# Patient Record
Sex: FEMALE | Race: BLACK OR AFRICAN AMERICAN
[De-identification: names, ages, dates, MRNs, and addresses within clinical notes are randomized per-mention and may not be internally consistent; named-entity substitution may affect disease eponyms.]

---

## 2017-07-25 ENCOUNTER — HOSPITAL ENCOUNTER (EMERGENCY)
Dept: HOSPITAL 62 - ER | Age: 82
Discharge: HOME | End: 2017-07-25
Payer: MEDICARE

## 2017-07-25 VITALS — DIASTOLIC BLOOD PRESSURE: 70 MMHG | SYSTOLIC BLOOD PRESSURE: 189 MMHG
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
## 2017-07-25 DIAGNOSIS — S41.152A: Primary | ICD-10-CM

## 2017-07-25 DIAGNOSIS — I48.91: ICD-10-CM

## 2017-07-25 DIAGNOSIS — M79.602: ICD-10-CM

## 2017-07-25 PROCEDURE — 99283 EMERGENCY DEPT VISIT LOW MDM: CPT

## 2017-07-25 PROCEDURE — 90471 IMMUNIZATION ADMIN: CPT

## 2017-07-25 PROCEDURE — 93010 ELECTROCARDIOGRAM REPORT: CPT

## 2017-07-25 PROCEDURE — 90715 TDAP VACCINE 7 YRS/> IM: CPT

## 2017-07-25 PROCEDURE — 93005 ELECTROCARDIOGRAM TRACING: CPT

## 2017-07-25 NOTE — ER DOCUMENT REPORT
ED Animal Bite





- General


Chief Complaint: Animal Bite


Stated Complaint: ANIMAL BITE/LEFT ARM


Time Seen by Provider: 07/25/17 16:25


Mode of Arrival: Ambulatory


Information source: Patient


Notes: 


Patient states that she was taking water to her pet donkey when he grabbed her 

left arm.  She tried to pull her arm away and this caused some skin to tear.  

She denies any other injuries or wounds.  She states that she does not believe 

the donkey was provoked. The donkey  has not been acting abnormally lately.  

She does not know if the donkey is up-to-date on immunizations.  She complains 

of some mild left arm pain.  It is worse when touched them better for prolonged 

period there is no radiation of the symptoms.  The symptoms are constant.


TRAVEL OUTSIDE OF THE [place] IN LAST 30 DAYS: No





- HPI


Location of injury: LUE


Severity of injury: Bitten


Onset: Just prior to arrival


Quality of pain: Achy


Severity: Mild


Appearance of animal: Appeared well


Animal's immunizations: Unknown





- Related Data


Allergies/Adverse Reactions: 


 





No Known Allergies Allergy (Verified 07/25/17 16:20)


 











Past Medical History





- General


Information source: Patient





- Social History


Smoking Status: Never Smoker


Frequency of alcohol use: None


Drug Abuse: None


Family History: Reviewed & Not Pertinent


Patient has suicidal ideation: No


Patient has homicidal ideation: No





- Past Medical History


Cardiac Medical History: Reports: Hx Hypertension


Renal/ Medical History: Denies: Hx Peritoneal Dialysis


Past Surgical History: Reports: Hx Hysterectomy, Hx Orthopedic Surgery





- Immunizations


Hx Diphtheria, Pertussis, Tetanus Vaccination: Yes





Review of Systems





- Review of Systems


Constitutional: denies: Chills, Fever


Cardiovascular: denies: Chest pain, Palpitations, Heart racing


Respiratory: denies: Cough, Short of breath


Gastrointestinal: denies: Diarrhea, Vomiting





Physical Exam





- Vital signs


Vitals: 


 











Temp Pulse Resp BP Pulse Ox


 


 99.1 F   84   16   186/83 H  94 


 


 07/25/17 16:18  07/25/17 16:18  07/25/17 16:18  07/25/17 16:18  07/25/17 16:18














- General


General appearance: Appears well, Alert


In distress: None





- HEENT


Head: Normocephalic, Atraumatic


Eyes: Normal


Conjunctiva: Normal


External canal: Normal


Mouth/Lips: Normal


Mucous membranes: Moist


Pharynx: Normal


Neck: Normal





- Respiratory


Respiratory status: No respiratory distress


Chest status: Nontender


Breath sounds: Normal


Chest palpation: Normal





- Cardiovascular


Rhythm: Irregularly irregular


Pulses: Normal: Radial


Normal capillary refill: Yes





- Abdominal


Inspection: Normal


Distension: No distension


Bowel sounds: Normal


Tenderness: Nontender


Organomegaly: No organomegaly





- Back


Back: Normal, Nontender





- Extremities


General upper extremity: Tender, Normal ROM, Normal temperature.  No: Normal 

inspection, Normal color


General lower extremity: Normal inspection, Nontender, Normal color, Normal ROM

, Normal temperature, Normal weight bearing.  No: Jie's sign





- Neurological


Neuro grossly intact: Yes


Cognition: Normal


Orientation: AAOx4


[place] Coma Scale Eye Opening: Spontaneous


[place] Coma Scale Verbal: Oriented


[place] Coma Scale Motor: Obeys Commands


[place] Coma Scale Total: 15


Speech: Normal


Motor strength normal: LUE, RUE, LLE, RLE


Sensory: Normal





- Psychological


Associated symptoms: Normal affect, Normal mood





- Skin


Skin Temperature: Warm


Skin Moisture: Dry


Notes: 


Patient has a circular ecchymotic area with some abrasions consistent with a 

bite on the left posterior upper arm.





Course





- Vital Signs


Vital signs: 


 











Temp Pulse Resp BP Pulse Ox


 


 99.1 F   84   16   186/83 H  94 


 


 07/25/17 16:18  07/25/17 16:18  07/25/17 16:18  07/25/17 16:18  07/25/17 16:18














- EKG Interpretation by Me


Rate: Normal


Rhythm: A.Fib, A.Flutter


Axis/QRS: Left axis deviation - no ischemic changes





Discharge





- Discharge


Clinical Impression: 


 Animal bite of upper arm, Atrial fibrillation, new onset





Condition: Stable


Disposition: HOME, SELF-CARE


Instructions:  Animal Bites ([facility]), Atrial Fibrillation ([facility])


Additional Instructions: 


You have atrial fibrillation on your EKG.  Please call Dr. Monroy first thing in 

the morning to schedule an evaluation.  Please take an 81 mg baby aspirin every 

day until you see Dr. Monroy.  Please take antibiotics every day as scheduled for 

your arm.  Please follow-up with animal control concerning any further rabies 

testing.


Prescriptions: 


Amox Tr/Potassium Clavulanate [Augmentin 875-125 Tablet] 1 tab PO BID 10 Days


Amox Tr/Potassium Clavulanate [Augmentin 875-125 Tablet] 1 tab PO BID 7 Days

## 2017-07-26 NOTE — EKG REPORT
SEVERITY:- ABNORMAL ECG -

ATRIAL FLUTTER/FIBRILLATION, A-RATE 227

PROBABLE LVH WITH SECONDARY REPOL ABNRM

ST DEPRESSION, CONSIDER ISCHEMIA, ANT-LAT LDS

BORDERLINE PROLONGED QT INTERVAL

:

Confirmed by: Dorita Rosales MD 26-Jul-2017 16:08:45

## 2019-01-10 ENCOUNTER — HOSPITAL ENCOUNTER (OUTPATIENT)
Dept: HOSPITAL 62 - OD | Age: 84
End: 2019-01-10
Attending: INTERNAL MEDICINE
Payer: MEDICARE

## 2019-01-10 DIAGNOSIS — R94.31: ICD-10-CM

## 2019-01-10 DIAGNOSIS — D64.9: ICD-10-CM

## 2019-01-10 DIAGNOSIS — E78.49: ICD-10-CM

## 2019-01-10 DIAGNOSIS — I10: ICD-10-CM

## 2019-01-10 DIAGNOSIS — I34.0: Primary | ICD-10-CM

## 2019-01-10 DIAGNOSIS — I36.1: ICD-10-CM

## 2019-01-10 DIAGNOSIS — Z79.899: ICD-10-CM

## 2019-01-10 DIAGNOSIS — N19: ICD-10-CM

## 2019-01-10 LAB
ALBUMIN SERPL-MCNC: 4.6 G/DL (ref 3.5–5)
ALP SERPL-CCNC: 64 U/L (ref 38–126)
ALT SERPL-CCNC: 18 U/L (ref 9–52)
ANION GAP SERPL CALC-SCNC: 8 MMOL/L (ref 5–19)
AST SERPL-CCNC: 25 U/L (ref 14–36)
BILIRUB DIRECT SERPL-MCNC: 0.2 MG/DL (ref 0–0.4)
BILIRUB SERPL-MCNC: 0.9 MG/DL (ref 0.2–1.3)
BUN SERPL-MCNC: 18 MG/DL (ref 7–20)
CALCIUM: 9.5 MG/DL (ref 8.4–10.2)
CHLORIDE SERPL-SCNC: 100 MMOL/L (ref 98–107)
CHOLEST SERPL-MCNC: 191.98 MG/DL (ref 0–200)
CO2 SERPL-SCNC: 36 MMOL/L (ref 22–30)
GLUCOSE SERPL-MCNC: 150 MG/DL (ref 75–110)
LDLC SERPL DIRECT ASSAY-MCNC: 71 MG/DL (ref ?–100)
POTASSIUM SERPL-SCNC: 3.1 MMOL/L (ref 3.6–5)
PROT SERPL-MCNC: 7.5 G/DL (ref 6.3–8.2)
SODIUM SERPL-SCNC: 143.8 MMOL/L (ref 137–145)
TRIGL SERPL-MCNC: 58 MG/DL (ref ?–150)
VLDLC SERPL CALC-MCNC: 12 MG/DL (ref 10–31)

## 2019-01-10 PROCEDURE — 80053 COMPREHEN METABOLIC PANEL: CPT

## 2019-01-10 PROCEDURE — 80061 LIPID PANEL: CPT

## 2019-01-10 PROCEDURE — 36415 COLL VENOUS BLD VENIPUNCTURE: CPT

## 2019-02-09 ENCOUNTER — HOSPITAL ENCOUNTER (OUTPATIENT)
Dept: HOSPITAL 62 - OD | Age: 84
End: 2019-02-09
Attending: INTERNAL MEDICINE
Payer: MEDICARE

## 2019-02-09 DIAGNOSIS — Z79.899: ICD-10-CM

## 2019-02-09 DIAGNOSIS — I10: ICD-10-CM

## 2019-02-09 DIAGNOSIS — N19: Primary | ICD-10-CM

## 2019-02-09 DIAGNOSIS — D64.9: ICD-10-CM

## 2019-02-09 DIAGNOSIS — I36.1: ICD-10-CM

## 2019-02-09 DIAGNOSIS — I34.0: ICD-10-CM

## 2019-02-09 DIAGNOSIS — E78.5: ICD-10-CM

## 2019-02-09 DIAGNOSIS — R06.02: ICD-10-CM

## 2019-02-09 LAB
ALBUMIN SERPL-MCNC: 4.7 G/DL (ref 3.5–5)
ALP SERPL-CCNC: 71 U/L (ref 38–126)
ALT SERPL-CCNC: 16 U/L (ref 9–52)
ANION GAP SERPL CALC-SCNC: 12 MMOL/L (ref 5–19)
AST SERPL-CCNC: 25 U/L (ref 14–36)
BILIRUB DIRECT SERPL-MCNC: 0.3 MG/DL (ref 0–0.4)
BILIRUB SERPL-MCNC: 1.1 MG/DL (ref 0.2–1.3)
BUN SERPL-MCNC: 24 MG/DL (ref 7–20)
CALCIUM: 9.6 MG/DL (ref 8.4–10.2)
CHLORIDE SERPL-SCNC: 103 MMOL/L (ref 98–107)
CO2 SERPL-SCNC: 29 MMOL/L (ref 22–30)
GLUCOSE SERPL-MCNC: 101 MG/DL (ref 75–110)
POTASSIUM SERPL-SCNC: 3.9 MMOL/L (ref 3.6–5)
PROT SERPL-MCNC: 7.8 G/DL (ref 6.3–8.2)
SODIUM SERPL-SCNC: 144.2 MMOL/L (ref 137–145)

## 2019-02-09 PROCEDURE — 80053 COMPREHEN METABOLIC PANEL: CPT

## 2019-02-09 PROCEDURE — 36415 COLL VENOUS BLD VENIPUNCTURE: CPT

## 2020-07-06 ENCOUNTER — HOSPITAL ENCOUNTER (OUTPATIENT)
Dept: HOSPITAL 62 - OD | Age: 85
End: 2020-07-06
Attending: SPECIALIST
Payer: MEDICARE

## 2020-07-06 DIAGNOSIS — Z79.899: ICD-10-CM

## 2020-07-06 DIAGNOSIS — E78.5: ICD-10-CM

## 2020-07-06 DIAGNOSIS — D64.9: Primary | ICD-10-CM

## 2020-07-06 DIAGNOSIS — I10: ICD-10-CM

## 2020-07-06 DIAGNOSIS — I34.0: ICD-10-CM

## 2020-07-06 DIAGNOSIS — R94.31: ICD-10-CM

## 2020-07-06 LAB
ADD MANUAL DIFF: NO
ALBUMIN SERPL-MCNC: 4.1 G/DL (ref 3.5–5)
ALP SERPL-CCNC: 52 U/L (ref 38–126)
ANION GAP SERPL CALC-SCNC: 6 MMOL/L (ref 5–19)
AST SERPL-CCNC: 23 U/L (ref 14–36)
BASOPHILS # BLD AUTO: 0 10^3/UL (ref 0–0.2)
BASOPHILS NFR BLD AUTO: 0.4 % (ref 0–2)
BILIRUB DIRECT SERPL-MCNC: 0 MG/DL (ref 0–0.4)
BILIRUB SERPL-MCNC: 0.6 MG/DL (ref 0.2–1.3)
BUN SERPL-MCNC: 27 MG/DL (ref 7–20)
CALCIUM: 9.2 MG/DL (ref 8.4–10.2)
CHLORIDE SERPL-SCNC: 103 MMOL/L (ref 98–107)
CHOLEST SERPL-MCNC: 156.52 MG/DL (ref 0–200)
CO2 SERPL-SCNC: 34 MMOL/L (ref 22–30)
EOSINOPHIL # BLD AUTO: 0 10^3/UL (ref 0–0.6)
EOSINOPHIL NFR BLD AUTO: 1 % (ref 0–6)
ERYTHROCYTE [DISTWIDTH] IN BLOOD BY AUTOMATED COUNT: 14.6 % (ref 11.5–14)
GLUCOSE SERPL-MCNC: 112 MG/DL (ref 75–110)
HCT VFR BLD CALC: 35.7 % (ref 36–47)
HGB BLD-MCNC: 12.2 G/DL (ref 12–15.5)
LDLC SERPL DIRECT ASSAY-MCNC: 56 MG/DL (ref ?–100)
LYMPHOCYTES # BLD AUTO: 0.7 10^3/UL (ref 0.5–4.7)
LYMPHOCYTES NFR BLD AUTO: 30.2 % (ref 13–45)
MCH RBC QN AUTO: 31.9 PG (ref 27–33.4)
MCHC RBC AUTO-ENTMCNC: 34.2 G/DL (ref 32–36)
MCV RBC AUTO: 93 FL (ref 80–97)
MONOCYTES # BLD AUTO: 0.3 10^3/UL (ref 0.1–1.4)
MONOCYTES NFR BLD AUTO: 13.4 % (ref 3–13)
NEUTROPHILS # BLD AUTO: 1.3 10^3/UL (ref 1.7–8.2)
NEUTS SEG NFR BLD AUTO: 55 % (ref 42–78)
PLATELET # BLD: 113 10^3/UL (ref 150–450)
POTASSIUM SERPL-SCNC: 3.8 MMOL/L (ref 3.6–5)
PROT SERPL-MCNC: 7 G/DL (ref 6.3–8.2)
RBC # BLD AUTO: 3.82 10^6/UL (ref 3.72–5.28)
TOTAL CELLS COUNTED % (AUTO): 100 %
TRIGL SERPL-MCNC: 96 MG/DL (ref ?–150)
VLDLC SERPL CALC-MCNC: 19 MG/DL (ref 10–31)
WBC # BLD AUTO: 2.3 10^3/UL (ref 4–10.5)

## 2020-07-06 PROCEDURE — 36415 COLL VENOUS BLD VENIPUNCTURE: CPT

## 2020-07-06 PROCEDURE — 80076 HEPATIC FUNCTION PANEL: CPT

## 2020-07-06 PROCEDURE — 80061 LIPID PANEL: CPT

## 2020-07-06 PROCEDURE — 85025 COMPLETE CBC W/AUTO DIFF WBC: CPT

## 2020-07-06 PROCEDURE — 80048 BASIC METABOLIC PNL TOTAL CA: CPT
